# Patient Record
Sex: MALE | Race: WHITE | NOT HISPANIC OR LATINO | URBAN - METROPOLITAN AREA
[De-identification: names, ages, dates, MRNs, and addresses within clinical notes are randomized per-mention and may not be internally consistent; named-entity substitution may affect disease eponyms.]

---

## 2017-05-09 ENCOUNTER — EMERGENCY (EMERGENCY)
Facility: HOSPITAL | Age: 52
LOS: 1 days | Discharge: HOME | End: 2017-05-09
Attending: EMERGENCY MEDICINE
Payer: MEDICAID

## 2017-05-09 VITALS
WEIGHT: 199.96 LBS | HEIGHT: 71 IN | DIASTOLIC BLOOD PRESSURE: 80 MMHG | OXYGEN SATURATION: 98 % | TEMPERATURE: 98 F | SYSTOLIC BLOOD PRESSURE: 132 MMHG | HEART RATE: 87 BPM | RESPIRATION RATE: 18 BRPM

## 2017-05-09 PROCEDURE — 99283 EMERGENCY DEPT VISIT LOW MDM: CPT | Mod: 25

## 2017-05-09 PROCEDURE — 99282 EMERGENCY DEPT VISIT SF MDM: CPT

## 2017-05-09 NOTE — ED PROVIDER NOTE - NS ED MD SCRIBE ATTENDING SCRIBE SECTIONS
HIV/DISPOSITION/HISTORY OF PRESENT ILLNESS/REVIEW OF SYSTEMS/VITAL SIGNS( Pullset)/PAST MEDICAL/SURGICAL/SOCIAL HISTORY/PHYSICAL EXAM

## 2017-05-09 NOTE — ED PROVIDER NOTE - MEDICAL DECISION MAKING DETAILS
50 y/o M pt with lump in subanconeus cyst vs other cyst. Pt instructed to f/u with dermatologist regarding bump. I&D attempted but no discharge or fluid came out. Pt's info was placed in care coordinator book, and suggest to f/u with general surgery. 52 y/o M pt with lump in sebacous cyst vs other cyst. Pt instructed to f/u with dermatologist regarding bump. I&D attempted but no discharge or fluid came out. Pt's info was placed in care coordinator book, and suggest to f/u with general surgery.

## 2017-05-09 NOTE — ED PROVIDER NOTE - OBJECTIVE STATEMENT
50 y/o M pt with no significant PMHx presents to ED c/o growing lump on his scalp x 1 year. Pt notes the lump increasing in size in the last three weeks with more discomfort. Pt has an appointment wit the dermatologist on 5/26/17. Pt denies fever, chills, nausea, vomiting, diarrhea, SOB, CP, discharge from the lump, or any other complaints. NKDA.

## 2017-05-13 DIAGNOSIS — L72.0 EPIDERMAL CYST: ICD-10-CM

## 2023-05-20 NOTE — ED PROVIDER NOTE - RELIEVING FACTORS
[facility]  EMERGENCY DEPARTMENT ENCOUNTER      Pt Name: Quincy Pride  MRN: [id number]  Rhiannagfnasrin 1985  Date of evaluation: 5/20/2023  Provider: Nandini Jimenez PA-C    CHIEF COMPLAINT       Chief Complaint   Patient presents with    Cough         HISTORY OF PRESENT ILLNESS   (Location/Symptom, Timing/Onset, Context/Setting, Quality, Duration, Modifying Factors, Severity)  Note limiting factors. 40 yo male presents with a 2 day history of cough. His cough is worse in the morning. He has a history of bronchitis. He had his gallbladder removed 5 days ago. He denies congestion, fever, chills, shortness of breath, difficulty breathing, chest pain. The history is provided by the patient and the spouse. No  was used. Review of External Medical Records:     Nursing Notes were reviewed. REVIEW OF SYSTEMS    (2-9 systems for level 4, 10 or more for level 5)     Review of Systems   Constitutional:  Negative for fever. HENT:  Negative for trouble swallowing. Eyes:  Negative for visual disturbance. Respiratory:  Positive for cough. Negative for shortness of breath. Cardiovascular:  Negative for chest pain. Gastrointestinal:  Negative for abdominal pain. Genitourinary:  Negative for difficulty urinating. Musculoskeletal:  Negative for gait problem. Skin:  Negative for color change. Neurological:  Negative for headaches. Except as noted above the remainder of the review of systems was reviewed and negative.        PAST MEDICAL HISTORY     Past Medical History:   Diagnosis Date    Anxiety     COVID-19 vaccine series completed     Moderna dose #1 received on 4/25/2021; dose #2 received on 5/23/21    GERD (gastroesophageal reflux disease)     Hypertension     Ill-defined condition 2022    ANXIETY INDUCED HYPERTENSION          SURGICAL HISTORY       Past Surgical History:   Procedure Laterality Date    HEMORRHOID SURGERY      UROLOGICAL SURGERY      Surgery
none